# Patient Record
Sex: FEMALE | Race: WHITE
[De-identification: names, ages, dates, MRNs, and addresses within clinical notes are randomized per-mention and may not be internally consistent; named-entity substitution may affect disease eponyms.]

---

## 2020-03-30 ENCOUNTER — HOSPITAL ENCOUNTER (OUTPATIENT)
Dept: HOSPITAL 33 - SDC | Age: 30
Discharge: HOME | End: 2020-03-30
Attending: SURGERY
Payer: COMMERCIAL

## 2020-03-30 VITALS — HEART RATE: 78 BPM | SYSTOLIC BLOOD PRESSURE: 118 MMHG | DIASTOLIC BLOOD PRESSURE: 74 MMHG

## 2020-03-30 VITALS — OXYGEN SATURATION: 100 %

## 2020-03-30 DIAGNOSIS — Z79.899: ICD-10-CM

## 2020-03-30 DIAGNOSIS — K80.10: Primary | ICD-10-CM

## 2020-03-30 DIAGNOSIS — E11.9: ICD-10-CM

## 2020-03-30 LAB
ANION GAP SERPL CALC-SCNC: 12 MEQ/L (ref 5–15)
BUN SERPL-MCNC: 12 MG/DL (ref 7–17)
CALCIUM SPEC-MCNC: 9.2 MG/DL (ref 8.4–10.2)
CHLORIDE SERPL-SCNC: 105 MMOL/L (ref 98–107)
CO2 SERPL-SCNC: 29 MMOL/L (ref 22–30)
CREAT SERPL-MCNC: 0.72 MG/DL (ref 0.52–1.04)
GLUCOSE SERPL-MCNC: 98 MG/DL (ref 74–106)
POTASSIUM SERPLBLD-SCNC: 4.3 MMOL/L (ref 3.5–5.1)
SODIUM SERPL-SCNC: 141 MMOL/L (ref 137–145)

## 2020-03-30 PROCEDURE — 36415 COLL VENOUS BLD VENIPUNCTURE: CPT

## 2020-03-30 PROCEDURE — 84703 CHORIONIC GONADOTROPIN ASSAY: CPT

## 2020-03-30 PROCEDURE — 80048 BASIC METABOLIC PNL TOTAL CA: CPT

## 2020-03-30 NOTE — HP
DATE OF SURGERY:   03/30/2020 



HISTORY OF PRESENT ILLNESS: The patient is a 29 year-old with a lot of pain epigastrium 
associated with nausea worse recently, worse with increased eating particularly big meal. 
No jaundice. She has had some loose stools. Ultrasound cholelithiasis. I feel she has 
symptomatic cholelithiasis acute exacerbation of chronic cholecystitis. I feel she would 
benefit from cholecystectomy. 



PAST MEDICAL HISTORY:  Diabetes. 



PAST SURGICAL HISTORY:  Tubes in the ears. Lymph node removed right ear. She had removal 
of some wisdom teeth in the past. 



MEDICATIONS:  Metformin Extended Release, low dose aspirin. 



ALLERGIES:  PENICILLIN. AMOXICILLIN. ERYTHROMYCIN.

  

FAMILY HISTORY:  Renal disease, diabetes.  Breast and lung cancer. 



SOCIAL HISTORY:  History of less than a pack per day smoker. Rare alcohol use. 



REVIEW OF SYSTEMS:  Fourteen systems reviewed. No chest pain or palpitations. Other 
systems negative or noncontributory as above and per preadmission questionnaire. 



PHYSICAL EXAMINATION:  

GENERAL:  No acute distress.

HEENT: Sclerae nonicteric.

NECK:  No JVD.

CHEST: Equal excursion, nonlabored breathing. 

CVS:  Regular rate and rhythm. 

ABDOMEN:  Soft. No peritoneal signs. Tenderness epigastrium. 

EXTREMITIES:  No significant edema.

NEURO:  Alert, oriented, moving extremities symmetrically. No gross motor deficits noted.

PSYCH: Appropriate mood and affect.  



IMPRESSION: Symptomatic cholelithiasis, acute exacerbation of chronic cholecystitis. I 
feel the patient will benefit from cholecystectomy. Risks and benefits explained in detail 
including but not limited to bleeding or infection, risk of trocar injury or hernia, risk 
of bile, bladder, blood vessel injury, risk of bile leak, bile duct injury, retained stone 
or sludge possibly requiring further procedure either open or ERCP, general risk of 
anesthesia, deep venous thrombosis, pulmonary embolism, pneumonia, perioperative risk of 
aches, pains, bloating, constipation and/or loose stools possibly even chronic in nature, 
possibility of needing an open procedure, possibility the procedure may not improve her 
symptoms. She may need further work up, endoscopy, other studies or procedures. She 
understands and agrees to the planned procedure, will proceed with outpatient laparoscopic 
cholecystectomy with possible open.

## 2020-03-30 NOTE — OP
SURGERY DATE/TIME:   03/30/2020  1200



PREOPERATIVE DIAGNOSIS:     Symptomatic cholelithiasis, acute exacerbation of chronic 
cholecystitis. 



POSTOPERATIVE DIAGNOSIS:     Symptomatic cholelithiasis, acute exacerbation of chronic 
cholecystitis. 



PROCEDURE:    Laparoscopic cholecystectomy.



SURGEON:        Dr. Cain Soto.



ANESTHESIA:    General.



ESTIMATED BLOOD LOSS:    Minimal.



INDICATIONS:  As noted above. Risks and benefits explained in detail but not limited to 
and consent obtained.     



 DESCRIPTION OF PROCEDURE AND FINDINGS: The patient was taken to the operating room. 
General anesthesia induced. Abdomen prepped and draped in the usual sterile fashion. After 
official time out and no disagreement with planned procedure, a transverse incision made 
at the supraumbilical area. Fascia grasped and pulled upward. Veress needle inserted and 
tested with saline. Pneumoperitoneum accomplished insufflating opening pressure of 0-15.  
An 11 mm bladeless port and camera inserted without difficulty followed by two - 5 mm 
right upper quadrant ports and 5 mm epigastric port. The gallbladder grasped retracted 
over the edge of the liver and laterally away from Calot's triangle dissecting posterior, 
lateral to anterior fashion.  Slowly and carefully cystic duct and infundibular area 
slowly and carefully well skeletonized until the critical view obtained both anteriorly 
and posteriorly. Once this is accomplished, cystic duct and cystic artery clipped x3 and 
divided in usual fashion. Gallbladder was vascular requiring clipping additional side 
branches off the cystic artery as necessary directly on the gallbladder wall. The 
gallbladder slowly and carefully dissected free from its dense attachment to liver bed 
staying directly on the gallbladder wall. Just prior to releasing from final attachments 
to the anterior edge of the liver, the liver bed re-inspected. Clips noted in place in 
cystic duct and cystic artery stump. No signs of any active bleeding or bile leakage. It 
was felt there was no benefit from drain placement. Gallbladder released from final 
attachments, pulled up and out the supraumbilical port site and passed off for pathology. 
The port site closed with puncture closure device with #1 Vicryl under direct 
visualization of the camera. Copious amount of irrigation accomplished lateral to the 
liver and subhepatic space irrigating clear. Liver bed re-inspected. Clips noted in place 
in cystic duct and cystic artery stump. No signs of any active bleeding or bile leakage. 
It was felt there was no benefit in drain placement. Pneumoperitoneum decompressed. The 
wound irrigated out. Skin incision closed with 4-0 Vicryl. Steri-Strips and sterile 
dressing applied. 0.25% Marcaine local injected along the skin incision fascial defect. 
The patient tolerated the procedure well. There were no immediate complications. Findings 
discussed with the family out in the waiting area.